# Patient Record
Sex: MALE | URBAN - METROPOLITAN AREA
[De-identification: names, ages, dates, MRNs, and addresses within clinical notes are randomized per-mention and may not be internally consistent; named-entity substitution may affect disease eponyms.]

---

## 2018-05-11 ENCOUNTER — TRANSFERRED RECORDS (OUTPATIENT)
Dept: HEALTH INFORMATION MANAGEMENT | Facility: CLINIC | Age: 78
End: 2018-05-11

## 2018-05-25 ENCOUNTER — TRANSFERRED RECORDS (OUTPATIENT)
Dept: HEALTH INFORMATION MANAGEMENT | Facility: CLINIC | Age: 78
End: 2018-05-25

## 2018-06-01 ENCOUNTER — CARE COORDINATION (OUTPATIENT)
Dept: ONCOLOGY | Facility: CLINIC | Age: 78
End: 2018-06-01

## 2018-06-01 ENCOUNTER — ONCOLOGY VISIT (OUTPATIENT)
Dept: ONCOLOGY | Facility: CLINIC | Age: 78
End: 2018-06-01
Attending: INTERNAL MEDICINE
Payer: MEDICARE

## 2018-06-01 VITALS
TEMPERATURE: 98.2 F | WEIGHT: 205 LBS | OXYGEN SATURATION: 98 % | RESPIRATION RATE: 18 BRPM | HEART RATE: 82 BPM | HEIGHT: 72 IN | DIASTOLIC BLOOD PRESSURE: 77 MMHG | BODY MASS INDEX: 27.77 KG/M2 | SYSTOLIC BLOOD PRESSURE: 130 MMHG

## 2018-06-01 DIAGNOSIS — C22.0 HCC (HEPATOCELLULAR CARCINOMA) (H): Primary | ICD-10-CM

## 2018-06-01 PROCEDURE — 99203 OFFICE O/P NEW LOW 30 MIN: CPT | Mod: ZP | Performed by: INTERNAL MEDICINE

## 2018-06-01 PROCEDURE — G0463 HOSPITAL OUTPT CLINIC VISIT: HCPCS | Mod: ZF

## 2018-06-01 RX ORDER — GLIPIZIDE 5 MG/1
5 TABLET ORAL
COMMUNITY
Start: 2018-02-19

## 2018-06-01 RX ORDER — FLUNISOLIDE 0.25 MG/ML
2 SOLUTION NASAL
COMMUNITY
Start: 2017-08-17

## 2018-06-01 RX ORDER — ASPIRIN 81 MG/1
81 TABLET, CHEWABLE ORAL
COMMUNITY
Start: 2014-03-30

## 2018-06-01 RX ORDER — IBUPROFEN 200 MG
400 TABLET ORAL
COMMUNITY
Start: 2011-11-25

## 2018-06-01 RX ORDER — SIMVASTATIN 40 MG
40 TABLET ORAL
COMMUNITY
Start: 2018-02-19

## 2018-06-01 RX ORDER — FUROSEMIDE 40 MG
TABLET ORAL
Refills: 3 | COMMUNITY
Start: 2018-05-31

## 2018-06-01 RX ORDER — SPIRONOLACTONE 50 MG/1
TABLET, FILM COATED ORAL
Refills: 3 | COMMUNITY
Start: 2018-05-31

## 2018-06-01 RX ORDER — MECLOFENAMATE SODIUM 100 MG/1
CAPSULE ORAL
COMMUNITY
Start: 2017-10-31

## 2018-06-01 RX ORDER — DOXAZOSIN 2 MG/1
2 TABLET ORAL
COMMUNITY
Start: 2018-02-19

## 2018-06-01 ASSESSMENT — PAIN SCALES - GENERAL: PAINLEVEL: NO PAIN (0)

## 2018-06-01 NOTE — MR AVS SNAPSHOT
"              After Visit Summary   2018    Saul Posada    MRN: [id number]           Patient Information     Date Of Birth          1940        Visit Information        Provider Department      2018 4:00 PM Chidi Magana MD [facility]        Today's Diagnoses     HCC (hepatocellular carcinoma) (H)    -  1       Follow-ups after your visit        Follow-up notes from your care team     Return if symptoms worsen or fail to improve.      Who to contact     If you have questions or need follow up information about today's clinic visit or your schedule please contact [facility] directly at [phone].  Normal or non-critical lab and imaging results will be communicated to you by MyChart, letter or phone within 4 business days after the clinic has received the results. If you do not hear from us within 7 days, please contact the clinic through MyChart or phone. If you have a critical or abnormal lab result, we will notify you by phone as soon as possible.  Submit refill requests through [employer] or call your pharmacy and they will forward the refill request to us. Please allow 3 business days for your refill to be completed.          Additional Information About Your Visit        MyChart Information     [employer] lets you send messages to your doctor, view your test results, renew your prescriptions, schedule appointments and more. To sign up, go to [email or web address] . Click on \"Log in\" on the left side of the screen, which will take you to the Welcome page. Then click on \"Sign up Now\" on the right side of the page.     You will be asked to enter the access code listed below, as well as some personal information. Please follow the directions to create your username and password.     Your access code is: [email or web address]  Expires: 2018  6:30 AM     Your access code will  in 90 days. If you need help or a new code, please call your [place] clinic or " [phone].        Care EveryWhere ID     This is your Care EveryWhere ID. This could be used by other organizations to access your [place] medical records  [id number]        Your Vitals Were     Pulse Temperature Respirations Height Pulse Oximetry BMI (Body Mass Index)    82 98.2  F (36.8  C) 18 1.829 m (6') 98% 27.8 kg/m2       Blood Pressure from Last 3 Encounters:   No data found for BP    Weight from Last 3 Encounters:   No data found for Wt              Today, you had the following     No orders found for display       Primary Care Provider Office Phone # Fax #    Nandinisteven Bashir [phone]       [facility] [address]  [facility] [address]        Equal Access to Services     ARNOLDO MOORE : Hadii bert vaughn hadasho Soomaali, waaxda luqadaha, qaybta kaalmada adeegyada, waxay nikain carole espana . So [facility] [phone].    ATENCIÓN: Si habla español, tiene a marte disposición servicios gratuitos de asistencia lingüística. [facility] [phone].    We comply with applicable federal civil rights laws and [place] laws. We do not discriminate on the basis of race, color, national origin, age, disability, sex, sexual orientation, or gender identity.            Thank you!     Thank you for choosing [facility]  for your care. Our goal is always to provide you with excellent care. Hearing back from our patients is one way we can continue to improve our services. Please take a few minutes to complete the written survey that you may receive in the mail after your visit with us. Thank you!             Your Updated Medication List - Protect others around you: Learn how to safely use, store and throw away your medicines at www.disposemymeds.org.          This list is accurate as of 6/1/18 11:59 PM.  Always use your most recent med list.                   Brand Name Dispense Instructions for use Diagnosis    aspirin 81 MG chewable tablet      Take 81 mg by mouth         Calcium + D3 600-200 MG-UNIT Tabs           doxazosin 2 MG tablet    CARDURA     Take 2 mg by mouth        flunisolide 25 MCG/ACT (0.025%) Soln spray    NASALIDE     2 sprays        furosemide 40 MG tablet    LASIX     TK 2 TS PO QAM        glipiZIDE 5 MG tablet    GLUCOTROL     Take 5 mg by mouth        ibuprofen 200 MG tablet    ADVIL/MOTRIN     Take 400 mg by mouth        MAGNESIUM PO           Meclofenamate Sodium 100 MG Caps           omeprazole 20 MG CR capsule    priLOSEC     Take 20 mg by mouth        simvastatin 40 MG tablet    ZOCOR     Take 40 mg by mouth        spironolactone 50 MG tablet    ALDACTONE     TK 1 T PO QD

## 2018-06-01 NOTE — LETTER
6/1/2018       RE: Saul Posada  [address] [place] [address]     Dear Colleague,    Thank you for referring your patient, Saul Posada, to the [facility]. Please see a copy of my visit note below.    DATE OF VISIT: Jun 1, 2018    REASON FOR REFERRAL:   Hepatocellular carcinoma    HISTORY OF PRESENT ILLNESS:     77-year-old male with past medical history mentioned below will develop symptoms of weight gained associated abdominal bloating and early satiety. He eventually presented to his primary care provider on 05/10/18 and a CT scan of the dominant was performed which showed 7.8 cm mass in the right hepatic lobe, mediastinal adenopathy, evidence of cirrhosis and portal hypertension including esophageal varices, large ascites and extensive portal vein thrombosis. Also noted is a large infrarenal abdominal aortic aneurysm 7.3 cm as well as urinary bladder tumor.. Patient underwent paracentesis with removal of 1.6 L of fluid with cytology negative. Subsequently underwent an MRI of the abdomen which showed 12 cm right hepatic mass with imaging findings highly suggestive of hepatocellular carcinoma.   Since then he has met with medical oncology as well as vascular surgery and urology for evaluation of above abnormalities.    Presents to the clinic today for second opinion regarding newly diagnosed hepatocellular carcinoma. He complains of abdominal distention with generalized fatigue and loss of appetite. Does inform me of a prolonged history of alcohol abuse although he has cut down on it recently denies chest pain, cough, bone pain, constipation, diarrhea, or any other complaints.  He is currently scheduled to undergo aneurysm repair on 060/05/18 as well as schedule for transurethral resection of the bladder tumor later this month.      REVIEW OF SYSTEMS:      ROS: 14 point ROS neg other than the symptoms noted above in the HPI.    PAST MEDICAL HISTORY:    Hypertension  Hyperlipidemia  Aortic aneurysm  Bladder cancer  Type 2 diabetes mellitus    PAST SURGICAL HISTORY:   Meniscal repair  Laminectomy  Cystoscopy    ALLERGIES:   Allergies as of 06/01/2018     (Not on File)       MEDICATIONS:   Current Outpatient Prescriptions   Medication Sig Dispense Refill     aspirin 81 MG chewable tablet Take 81 mg by mouth       Calcium Carb-Cholecalciferol (CALCIUM + D3) 600-200 MG-UNIT TABS        doxazosin (CARDURA) 2 MG tablet Take 2 mg by mouth       flunisolide (NASALIDE) 25 MCG/ACT (0.025%) SOLN spray 2 sprays       furosemide (LASIX) 40 MG tablet TK 2 TS PO QAM  3     glipiZIDE (GLUCOTROL) 5 MG tablet Take 5 mg by mouth       ibuprofen (ADVIL/MOTRIN) 200 MG tablet Take 400 mg by mouth       MAGNESIUM PO        Meclofenamate Sodium 100 MG CAPS        omeprazole (PRILOSEC) 20 MG CR capsule Take 20 mg by mouth       simvastatin (ZOCOR) 40 MG tablet Take 40 mg by mouth       spironolactone (ALDACTONE) 50 MG tablet TK 1 T PO QD  3        FAMILY HISTORY:   No family history on file.    SOCIAL HISTORY:   Social History     Social History     Marital status:      Spouse name: N/A     Number of children: N/A     Years of education: N/A     Social History Main Topics     Smoking status: Current Every Day Smoker     Types: Cigarettes     Smokeless tobacco: Current User     Alcohol use No     Drug use: None     Sexual activity: Not Asked     Other Topics Concern     None     Social History Narrative     None       PHYSICAL EXAMINATION:   /77 (BP Location: Left arm, Patient Position: Chair, Cuff Size: Adult Regular)  Pulse 82  Temp 98.2  F (36.8  C)  Resp 18  Ht 1.829 m (6')  Wt 93 kg (205 lb)  SpO2 98%  BMI 27.8 kg/m2  Wt Readings from Last 10 Encounters:   06/01/18 93 kg (205 lb)      ECOG performance status: 2  Exam:  Constitutional: healthy, alert and no distress  Head: Normocephalic. No masses, lesions, tenderness or abnormalities  Neck: Neck supple. No  "adenopathy.  ENT: ENT exam normal, no neck nodes or sinus tenderness  Cardiovascular: negative\", RRR. No murmurs, clicks gallops or rub  Respiratory: negative\". Lungs clear  Gastrointestinal: Abdomen soft, non-tender. Distended. BS normal. No masses, organomegaly  Musculoskeletal: extremities normal- no gross deformities noted, gait normal and normal muscle tone  Skin: no suspicious lesions or rashes  Neurologic: Gait normal. Sensation grossly WNL.  Psychiatric: mentation appears normal and affect normal/bright  Hematologic/Lymphatic/Immunologic: Normal cervical lymph nodes        LABORATORY RESULTS:            AFP 2858    IMAGING RESULTS:    MRI abdomen 05/12/18    FINDINGS: Shrunken liver with mild surface nodularity suggesting underlying  cirrhosis. A large heterogeneous multilobulated mass in the right hepatic lobe  measures approximately 10 x 12 x 8 cm, corresponding to findings depicted on CT  5/10/2018. Mass demonstrates heterogeneous areas of restricted diffusion and  heterogeneous enhancement on arterial and portal venous phase. Delayed  hepatobiliary phase demonstrates predominant hypoenhancement. There is expansion  and probable areas of heterogeneous enhancement involving the right and left  portal veins as well as the central main portal vein near the bifurcation  suggesting tumor thrombus. Thrombus involving the superior mesenteric vein and  proximal portal vein does not appear to enhance and appears to be bland  thrombus. Moderate volume ascites. No abdominopelvic adenopathy. Possible left  infrahilar adenopathy in the chest.    A 1.2 x 1.6 cm right adrenal nodule demonstrates signal dropout on out of phase  images consistent with benign adenoma.    Small benign cyst medial left hepatic lobe. Multiple bilateral renal cysts.  Contracted or diminutive gallbladder. Normal caliber bile ducts. Left adrenal  gland, spleen and pancreas are normal. Mild upper abdominal varices. Infrarenal  abdominal aortic " aneurysm measuring up to 7.3 cm with large amount mural  thrombus.     CONCLUSION:  1. Large heterogeneous mass posterior right hepatic lobe in a cirrhotic  appearing liver with findings suggesting portal vein invasion, most likely  hepatocellular carcinoma.  2. Possible thoracic left infrahilar adenopathy.  3. Benign right adrenal adenoma.  4. Infrarenal abdominal aortic aneurysm up to 7.3 cm.    CT ABD 05/10/18     CONCLUSION:   1.  Manifestations of hepatic fibrosis and portal venous hypertension, including  esophageal varices and large ascites. There is extensive thrombosis in the right  portal vein and branches, and subtotal thrombosis in the left and main portal  veins, as well as the proximal splenic and superior mesenteric veins. Please  note that tumor thrombus in the right portal vein is not excluded.    2.  There is a 7.8 cm infiltrative ill-defined mass in the right hepatic lobe,  concerning for hepatocellular carcinoma, cholangiocarcinoma, or other  malignancy. Hepatic abscess can appear similar. Correlation with clinical status  is recommended. Hepatic MRI with and without conventional gadolinium IV contrast  may provide additional information. Gastroenterology consultation advised.    3.  Possible necrotic 2.2 cm mediastinal adenopathy. This may be related to the  above finding.    4.  There is a 1.2 cm right adrenal gland, indeterminate in nature, and a small  metastasis is not excluded. This can also be assessed on MRI.    5.  Probable bilateral renal cysts and nonobstructive right nephrolithiasis. No  hydronephrosis.    6.  Large infrarenal abdominal aortic aneurysm at 7.3 cm with extensive mural  thrombus.    7.  There is a 1.0 cm right lateral urinary bladder wall polypoid tumor,  suspicious for primary bladder malignancy, and this can be correlated with  cystoscopy when the patient is clinically better      ASSESSMENT AND PLAN:    77-year-old male with past medical history significant for  alcohol abuse, type 2 diabetes, hypertension who developed symptoms of abdominal distention, weight gain or loss of appetite and on imaging workup was noted to have evidence of cirrhosis/portal hypertension as well as a 12 cm hepatic mass with imaging findings consistent with hepatocellular carcinoma. Alpha-fetoprotein elevated at 2858. Also noted on the imaging workup was mediastinal adenopathy as well as large aortic aneurysm and bladder tumor.    Presents to the oncology clinic today for second opinion.    Hepatocellular carcinoma  CHild Mack B  Clinically appears to be an advanced stage and possibly metastatic given the complete staging workup with CT chest as well as bone scan. Regarding treatment options, does not appear to be a surgical or transplant candidate given its current stage as well as old age. He may be a candidate for liver directed therapies by interventional radiology chemo/radio embolization. Certainly will consider Sorafinib as the systemic option. At this point however, patient is currently planning to undergo repair of the aneurysm party next week. patient was informed that it is reasonable to proceed with Aneurysm repair as it has a more immediate threat followed by seeing oncology to complete staging workup and determine final plan of care     Decompensated Cirrhosis with portal hypertension likely secondary to chronic alcohol abuse  Currently on diuretics and may require periodic paracentesis as well in near future.  Would need to establish care with gastroenterology    Abdominal aneurysm  Scheduled for repair next week    Bladder tumor  Following with urology      All the questions patient and the family member were answered to the best of my ability. The patient appreciated to controlled and would like to continue care with primary oncologist.    Return to clinic as needed.    Chart documentation with Dragon Voice recognition Software. Although reviewed after completion, some words and  grammatical errors may remain.    Chidi Magana MD  Attending Physician   Hematology/Medical Oncology

## 2018-06-01 NOTE — PROGRESS NOTES
DATE OF VISIT: Jun 1, 2018    REASON FOR REFERRAL:   Hepatocellular carcinoma    HISTORY OF PRESENT ILLNESS:     77-year-old male with past medical history mentioned below will develop symptoms of weight gained associated abdominal bloating and early satiety. He eventually presented to his primary care provider on 05/10/18 and a CT scan of the dominant was performed which showed 7.8 cm mass in the right hepatic lobe, mediastinal adenopathy, evidence of cirrhosis and portal hypertension including esophageal varices, large ascites and extensive portal vein thrombosis. Also noted is a large infrarenal abdominal aortic aneurysm 7.3 cm as well as urinary bladder tumor.. Patient underwent paracentesis with removal of 1.6 L of fluid with cytology negative. Subsequently underwent an MRI of the abdomen which showed 12 cm right hepatic mass with imaging findings highly suggestive of hepatocellular carcinoma.   Since then he has met with medical oncology as well as vascular surgery and urology for evaluation of above abnormalities.    Presents to the clinic today for second opinion regarding newly diagnosed hepatocellular carcinoma. He complains of abdominal distention with generalized fatigue and loss of appetite. Does inform me of a prolonged history of alcohol abuse although he has cut down on it recently denies chest pain, cough, bone pain, constipation, diarrhea, or any other complaints.  He is currently scheduled to undergo aneurysm repair on 060/05/18 as well as schedule for transurethral resection of the bladder tumor later this month.      REVIEW OF SYSTEMS:      ROS: 14 point ROS neg other than the symptoms noted above in the HPI.    PAST MEDICAL HISTORY:   Hypertension  Hyperlipidemia  Aortic aneurysm  Bladder cancer  Type 2 diabetes mellitus    PAST SURGICAL HISTORY:   Meniscal repair  Laminectomy  Cystoscopy    ALLERGIES:   Allergies as of 06/01/2018     (Not on File)       MEDICATIONS:   Current Outpatient  "Prescriptions   Medication Sig Dispense Refill     aspirin 81 MG chewable tablet Take 81 mg by mouth       Calcium Carb-Cholecalciferol (CALCIUM + D3) 600-200 MG-UNIT TABS        doxazosin (CARDURA) 2 MG tablet Take 2 mg by mouth       flunisolide (NASALIDE) 25 MCG/ACT (0.025%) SOLN spray 2 sprays       furosemide (LASIX) 40 MG tablet TK 2 TS PO QAM  3     glipiZIDE (GLUCOTROL) 5 MG tablet Take 5 mg by mouth       ibuprofen (ADVIL/MOTRIN) 200 MG tablet Take 400 mg by mouth       MAGNESIUM PO        Meclofenamate Sodium 100 MG CAPS        omeprazole (PRILOSEC) 20 MG CR capsule Take 20 mg by mouth       simvastatin (ZOCOR) 40 MG tablet Take 40 mg by mouth       spironolactone (ALDACTONE) 50 MG tablet TK 1 T PO QD  3        FAMILY HISTORY:   No family history on file.    SOCIAL HISTORY:   Social History     Social History     Marital status:      Spouse name: N/A     Number of children: N/A     Years of education: N/A     Social History Main Topics     Smoking status: Current Every Day Smoker     Types: Cigarettes     Smokeless tobacco: Current User     Alcohol use No     Drug use: None     Sexual activity: Not Asked     Other Topics Concern     None     Social History Narrative     None       PHYSICAL EXAMINATION:   /77 (BP Location: Left arm, Patient Position: Chair, Cuff Size: Adult Regular)  Pulse 82  Temp 98.2  F (36.8  C)  Resp 18  Ht 1.829 m (6')  Wt 93 kg (205 lb)  SpO2 98%  BMI 27.8 kg/m2  Wt Readings from Last 10 Encounters:   06/01/18 93 kg (205 lb)      ECOG performance status: 2  Exam:  Constitutional: healthy, alert and no distress  Head: Normocephalic. No masses, lesions, tenderness or abnormalities  Neck: Neck supple. No adenopathy.  ENT: ENT exam normal, no neck nodes or sinus tenderness  Cardiovascular: negative\", RRR. No murmurs, clicks gallops or rub  Respiratory: negative\". Lungs clear  Gastrointestinal: Abdomen soft, non-tender. Distended. BS normal. No masses, " organomegaly  Musculoskeletal: extremities normal- no gross deformities noted, gait normal and normal muscle tone  Skin: no suspicious lesions or rashes  Neurologic: Gait normal. Sensation grossly WNL.  Psychiatric: mentation appears normal and affect normal/bright  Hematologic/Lymphatic/Immunologic: Normal cervical lymph nodes        LABORATORY RESULTS:            AFP 2858    IMAGING RESULTS:    MRI abdomen 05/12/18    FINDINGS: Shrunken liver with mild surface nodularity suggesting underlying  cirrhosis. A large heterogeneous multilobulated mass in the right hepatic lobe  measures approximately 10 x 12 x 8 cm, corresponding to findings depicted on CT  5/10/2018. Mass demonstrates heterogeneous areas of restricted diffusion and  heterogeneous enhancement on arterial and portal venous phase. Delayed  hepatobiliary phase demonstrates predominant hypoenhancement. There is expansion  and probable areas of heterogeneous enhancement involving the right and left  portal veins as well as the central main portal vein near the bifurcation  suggesting tumor thrombus. Thrombus involving the superior mesenteric vein and  proximal portal vein does not appear to enhance and appears to be bland  thrombus. Moderate volume ascites. No abdominopelvic adenopathy. Possible left  infrahilar adenopathy in the chest.    A 1.2 x 1.6 cm right adrenal nodule demonstrates signal dropout on out of phase  images consistent with benign adenoma.    Small benign cyst medial left hepatic lobe. Multiple bilateral renal cysts.  Contracted or diminutive gallbladder. Normal caliber bile ducts. Left adrenal  gland, spleen and pancreas are normal. Mild upper abdominal varices. Infrarenal  abdominal aortic aneurysm measuring up to 7.3 cm with large amount mural  thrombus.     CONCLUSION:  1. Large heterogeneous mass posterior right hepatic lobe in a cirrhotic  appearing liver with findings suggesting portal vein invasion, most likely  hepatocellular  carcinoma.  2. Possible thoracic left infrahilar adenopathy.  3. Benign right adrenal adenoma.  4. Infrarenal abdominal aortic aneurysm up to 7.3 cm.    CT ABD 05/10/18     CONCLUSION:   1.  Manifestations of hepatic fibrosis and portal venous hypertension, including  esophageal varices and large ascites. There is extensive thrombosis in the right  portal vein and branches, and subtotal thrombosis in the left and main portal  veins, as well as the proximal splenic and superior mesenteric veins. Please  note that tumor thrombus in the right portal vein is not excluded.    2.  There is a 7.8 cm infiltrative ill-defined mass in the right hepatic lobe,  concerning for hepatocellular carcinoma, cholangiocarcinoma, or other  malignancy. Hepatic abscess can appear similar. Correlation with clinical status  is recommended. Hepatic MRI with and without conventional gadolinium IV contrast  may provide additional information. Gastroenterology consultation advised.    3.  Possible necrotic 2.2 cm mediastinal adenopathy. This may be related to the  above finding.    4.  There is a 1.2 cm right adrenal gland, indeterminate in nature, and a small  metastasis is not excluded. This can also be assessed on MRI.    5.  Probable bilateral renal cysts and nonobstructive right nephrolithiasis. No  hydronephrosis.    6.  Large infrarenal abdominal aortic aneurysm at 7.3 cm with extensive mural  thrombus.    7.  There is a 1.0 cm right lateral urinary bladder wall polypoid tumor,  suspicious for primary bladder malignancy, and this can be correlated with  cystoscopy when the patient is clinically better      ASSESSMENT AND PLAN:    77-year-old male with past medical history significant for alcohol abuse, type 2 diabetes, hypertension who developed symptoms of abdominal distention, weight gain or loss of appetite and on imaging workup was noted to have evidence of cirrhosis/portal hypertension as well as a 12 cm hepatic mass with imaging  findings consistent with hepatocellular carcinoma. Alpha-fetoprotein elevated at 2858. Also noted on the imaging workup was mediastinal adenopathy as well as large aortic aneurysm and bladder tumor.    Presents to the oncology clinic today for second opinion.    Hepatocellular carcinoma  CHild Mack B  Clinically appears to be an advanced stage and possibly metastatic given the complete staging workup with CT chest as well as bone scan. Regarding treatment options, does not appear to be a surgical or transplant candidate given its current stage as well as old age. He may be a candidate for liver directed therapies by interventional radiology chemo/radio embolization. Certainly will consider Sorafinib as the systemic option. At this point however, patient is currently planning to undergo repair of the aneurysm party next week. patient was informed that it is reasonable to proceed with Aneurysm repair as it has a more immediate threat followed by seeing oncology to complete staging workup and determine final plan of care     Decompensated Cirrhosis with portal hypertension likely secondary to chronic alcohol abuse  Currently on diuretics and may require periodic paracentesis as well in near future.  Would need to establish care with gastroenterology    Abdominal aneurysm  Scheduled for repair next week    Bladder tumor  Following with urology      All the questions patient and the family member were answered to the best of my ability. The patient appreciated to controlled and would like to continue care with primary oncologist.    Return to clinic as needed.    Chart documentation with Dragon Voice recognition Software. Although reviewed after completion, some words and grammatical errors may remain.    Chidi Magana MD  Attending Physician   Hematology/Medical Oncology

## 2018-06-01 NOTE — PROGRESS NOTES
Briefly met with pt and his sister (a nurse) today after his visit with Dr. Magana.  Pt stated he appreciated 's explanation of information.  At this time he is planning to continue his care at [facility].  Given author's card should he have interest/need to call in the future.

## 2018-06-01 NOTE — NURSING NOTE
Oncology Rooming Note    June 1, 2018 4:00 PM   Saul Posada is a 77 year old male who presents for:    Chief Complaint   Patient presents with     Oncology Clinic Visit     New-op/pt called self ref/recs in ce from [facility], scans pushed from [facility], recs [facility] onc     Initial Vitals: /77 (BP Location: Left arm, Patient Position: Chair, Cuff Size: Adult Regular)  Pulse 82  Temp 98.2  F (36.8  C)  Resp 18  Ht 1.829 m (6')  Wt 93 kg (205 lb)  SpO2 98%  BMI 27.8 kg/m2 Estimated body mass index is 27.8 kg/(m^2) as calculated from the following:    Height as of this encounter: 1.829 m (6').    Weight as of this encounter: 93 kg (205 lb). Body surface area is 2.17 meters squared.  No Pain (0) Comment: Data Unavailable   No LMP for male patient.  Allergies reviewed: Yes  Medications reviewed: Yes    Medications: Medication refills not needed today.  Pharmacy name entered into EPIC: Data Unavailable    Clinical concerns: Pt has concern about liver cancer     6 minutes for nursing intake (face to face time)     Marion Sharif, [facility]

## 2018-07-13 ENCOUNTER — CARE COORDINATION (OUTPATIENT)
Dept: ONCOLOGY | Facility: CLINIC | Age: 78
End: 2018-07-13